# Patient Record
Sex: FEMALE | Race: BLACK OR AFRICAN AMERICAN | URBAN - METROPOLITAN AREA
[De-identification: names, ages, dates, MRNs, and addresses within clinical notes are randomized per-mention and may not be internally consistent; named-entity substitution may affect disease eponyms.]

---

## 2021-11-11 ENCOUNTER — LAB OUTSIDE AN ENCOUNTER (OUTPATIENT)
Dept: URBAN - METROPOLITAN AREA CLINIC 124 | Facility: CLINIC | Age: 76
End: 2021-11-11

## 2021-11-11 ENCOUNTER — TELEPHONE ENCOUNTER (OUTPATIENT)
Dept: URBAN - METROPOLITAN AREA CLINIC 92 | Facility: CLINIC | Age: 76
End: 2021-11-11

## 2021-11-15 ENCOUNTER — LAB OUTSIDE AN ENCOUNTER (OUTPATIENT)
Dept: URBAN - METROPOLITAN AREA CLINIC 124 | Facility: CLINIC | Age: 76
End: 2021-11-15

## 2021-11-15 ENCOUNTER — OFFICE VISIT (OUTPATIENT)
Dept: URBAN - METROPOLITAN AREA CLINIC 124 | Facility: CLINIC | Age: 76
End: 2021-11-15
Payer: MEDICARE

## 2021-11-15 VITALS
SYSTOLIC BLOOD PRESSURE: 149 MMHG | BODY MASS INDEX: 36.37 KG/M2 | TEMPERATURE: 97.1 F | WEIGHT: 213 LBS | HEIGHT: 64 IN | DIASTOLIC BLOOD PRESSURE: 79 MMHG | HEART RATE: 82 BPM

## 2021-11-15 DIAGNOSIS — Z12.11 COLON CANCER SCREENING: ICD-10-CM

## 2021-11-15 DIAGNOSIS — K57.90 DIVERTICULOSIS: ICD-10-CM

## 2021-11-15 DIAGNOSIS — K21.9 GASTROESOPHAGEAL REFLUX DISEASE, UNSPECIFIED WHETHER ESOPHAGITIS PRESENT: ICD-10-CM

## 2021-11-15 DIAGNOSIS — R14.0 BLOATING SYMPTOM: ICD-10-CM

## 2021-11-15 PROCEDURE — 99204 OFFICE O/P NEW MOD 45 MIN: CPT | Performed by: INTERNAL MEDICINE

## 2021-11-15 RX ORDER — POLYETHYLENE GLYOCOL 3350, SODIUM CHLORIDE, SODIUM BICARBONATE AND POTASSIUM CHLORIDE 420; 11.2; 5.72; 1.48 G/4L; G/4L; G/4L; G/4L
AS DIRECTED POWDER, FOR SOLUTION NASOGASTRIC; ORAL AS DIRECTED
Qty: 420 GRAM | Refills: 0 | OUTPATIENT
Start: 2021-11-11 | End: 2021-11-12

## 2021-11-15 RX ORDER — ONDANSETRON HYDROCHLORIDE 4 MG/1
1 TABLET AS NEEDED TABLET, FILM COATED ORAL ONCE A DAY
Qty: 2 | Refills: 0 | OUTPATIENT
Start: 2021-11-11

## 2021-11-15 NOTE — HPI-TODAY'S VISIT:
This is a 76-year-old female referred by Dr. Navas for GI consultation of colitis and a copy of this note be sent to the referring provider.  Patient saw our PAJero back in 2013 at which time she had an evaluation for abdominal pain.  Patient had an upper endoscopy by Dr. Senior on October 18, 2013 this revealed a small hiatal hernia and some gastritis.  Biopsy showed gastritis but no H. pylori and esophageal biopsy was normal.  Patient had a colonoscopy done by Dr. Senior on October 16, 2013 as well that showed pandiverticulosis but no other abnormalities.  She had a CT scan done on November 8, 2013 this revealed no acute findings. Pt is on omeprazole and hyoscymaine. Pts brother had a cancerous polyp but she denies fam hx of colon ca. Pt denies consitpation but she is very gassy and sometimes stools are irregular and small. This occurred since Covid and she is not exercising now and not eating well. Pt also has back issues and has had 2 injections. Not having surgery for her back. Pt says her reflux is fine on omeprzole and denies dysphagia or heartburn. Pt loves beans and uses beano, she is lactose intolerance and uses lactaid. Pt is a retired Nurse.

## 2022-03-24 ENCOUNTER — CLAIMS CREATED FROM THE CLAIM WINDOW (OUTPATIENT)
Dept: URBAN - METROPOLITAN AREA SURGERY CENTER 16 | Facility: SURGERY CENTER | Age: 77
End: 2022-03-24
Payer: MEDICARE

## 2022-03-24 ENCOUNTER — OFFICE VISIT (OUTPATIENT)
Dept: URBAN - METROPOLITAN AREA SURGERY CENTER 16 | Facility: SURGERY CENTER | Age: 77
End: 2022-03-24

## 2022-03-24 ENCOUNTER — CLAIMS CREATED FROM THE CLAIM WINDOW (OUTPATIENT)
Dept: URBAN - METROPOLITAN AREA CLINIC 4 | Facility: CLINIC | Age: 77
End: 2022-03-24
Payer: MEDICARE

## 2022-03-24 DIAGNOSIS — Z83.71 FAMILY HISTORY OF COLON POLYPS: ICD-10-CM

## 2022-03-24 DIAGNOSIS — K31.89 ACQUIRED DEFORMITY OF DUODENUM: ICD-10-CM

## 2022-03-24 DIAGNOSIS — D12.5 ADENOMA OF SIGMOID COLON: ICD-10-CM

## 2022-03-24 DIAGNOSIS — K21.9 ACID REFLUX: ICD-10-CM

## 2022-03-24 DIAGNOSIS — K31.89 FOCAL FOVEOLAR HYPERPLASIA: ICD-10-CM

## 2022-03-24 DIAGNOSIS — D12.5 BENIGN NEOPLASM OF SIGMOID COLON: ICD-10-CM

## 2022-03-24 PROCEDURE — 43239 EGD BIOPSY SINGLE/MULTIPLE: CPT | Performed by: INTERNAL MEDICINE

## 2022-03-24 PROCEDURE — 88305 TISSUE EXAM BY PATHOLOGIST: CPT | Performed by: PATHOLOGY

## 2022-03-24 PROCEDURE — 45385 COLONOSCOPY W/LESION REMOVAL: CPT | Performed by: INTERNAL MEDICINE

## 2022-03-24 PROCEDURE — G8907 PT DOC NO EVENTS ON DISCHARG: HCPCS | Performed by: INTERNAL MEDICINE

## 2022-03-24 PROCEDURE — 88342 IMHCHEM/IMCYTCHM 1ST ANTB: CPT | Performed by: PATHOLOGY

## 2022-06-01 ENCOUNTER — OFFICE VISIT (OUTPATIENT)
Dept: URBAN - METROPOLITAN AREA CLINIC 92 | Facility: CLINIC | Age: 77
End: 2022-06-01
Payer: MEDICARE

## 2022-06-01 ENCOUNTER — WEB ENCOUNTER (OUTPATIENT)
Dept: URBAN - METROPOLITAN AREA CLINIC 92 | Facility: CLINIC | Age: 77
End: 2022-06-01

## 2022-06-01 VITALS
HEIGHT: 64 IN | DIASTOLIC BLOOD PRESSURE: 82 MMHG | SYSTOLIC BLOOD PRESSURE: 152 MMHG | TEMPERATURE: 97.1 F | HEART RATE: 96 BPM | BODY MASS INDEX: 35.68 KG/M2 | WEIGHT: 209 LBS

## 2022-06-01 DIAGNOSIS — Z83.71 FAMILY HISTORY OF COLONIC POLYPS: ICD-10-CM

## 2022-06-01 DIAGNOSIS — K21.9 GASTROESOPHAGEAL REFLUX DISEASE, UNSPECIFIED WHETHER ESOPHAGITIS PRESENT: ICD-10-CM

## 2022-06-01 DIAGNOSIS — R14.2 ERUCTATION: ICD-10-CM

## 2022-06-01 DIAGNOSIS — R14.0 BLOATING SYMPTOM: ICD-10-CM

## 2022-06-01 DIAGNOSIS — K57.30 COLON, DIVERTICULOSIS: ICD-10-CM

## 2022-06-01 DIAGNOSIS — Z86.010 HISTORY OF COLON POLYPS: ICD-10-CM

## 2022-06-01 DIAGNOSIS — R14.3 FLATULENCE: ICD-10-CM

## 2022-06-01 PROBLEM — 428283002: Status: ACTIVE | Noted: 2022-06-01

## 2022-06-01 PROBLEM — 397881000: Status: ACTIVE | Noted: 2021-11-11

## 2022-06-01 PROBLEM — 235595009: Status: ACTIVE | Noted: 2021-11-15

## 2022-06-01 PROCEDURE — 99213 OFFICE O/P EST LOW 20 MIN: CPT | Performed by: PHYSICIAN ASSISTANT

## 2022-06-01 RX ORDER — ONDANSETRON HYDROCHLORIDE 4 MG/1
1 TABLET AS NEEDED TABLET, FILM COATED ORAL ONCE A DAY
Qty: 2 | Refills: 0 | Status: ACTIVE | COMMUNITY
Start: 2021-11-11

## 2022-06-01 NOTE — HPI-TODAY'S VISIT:
This is a 76yoF presents for f/u after EGD/colonoscopy.   She had a colonoscopy 3/24/2022 which showed diverticulosis of sigm, descending and cecum, hemorrhoids and 3 6-10mm polyps c/w TVAs. Prep was only fair and rec repeat in 1 year rec. Prior colon in 2013 showed only pandiverticula. Her brother had a precancerous polyp but no FH colon CA. She has BMs daily but despite this notes painful gas and bloating, improved some with massaging her belly, prn gas-ex and lactaid pills, agg by high fiber foods and dairy. No hematochezia or melena.   Pt is on omeprazole and denies heartburn, regurg or dysphagia. EGD 3/24/2022 showed mild gastritis without HP and normal esophagus. She does have sign eructation.

## 2022-06-01 NOTE — PHYSICAL EXAM CONSTITUTIONAL:
well developed, well nourished , in no acute distress , ambulating w cane , normal communication ability

## 2022-09-30 NOTE — PHYSICAL EXAM HENT:
Head,  normocephalic,  atraumatic,  Face,  Face within normal limits,  Ears,  External ears within normal limits,  Nose/Nasopharynx,  External nose  normal appearance,  nares patent,  no nasal discharge,  Mouth and Throat,  Oral cavity appearance normal,  Breath odor normal,  Lips,  Appearance normal
Yes

## 2023-06-01 ENCOUNTER — OFFICE VISIT (OUTPATIENT)
Dept: URBAN - METROPOLITAN AREA CLINIC 92 | Facility: CLINIC | Age: 78
End: 2023-06-01
Payer: MEDICARE

## 2023-06-01 VITALS
HEART RATE: 82 BPM | TEMPERATURE: 97.2 F | BODY MASS INDEX: 34.15 KG/M2 | WEIGHT: 200 LBS | DIASTOLIC BLOOD PRESSURE: 76 MMHG | HEIGHT: 64 IN | SYSTOLIC BLOOD PRESSURE: 142 MMHG

## 2023-06-01 DIAGNOSIS — Z86.010 HISTORY OF COLON POLYPS: ICD-10-CM

## 2023-06-01 DIAGNOSIS — Z83.71 FAMILY HISTORY OF COLONIC POLYPS: ICD-10-CM

## 2023-06-01 DIAGNOSIS — K57.30 ACQUIRED DIVERTICULOSIS OF COLON: ICD-10-CM

## 2023-06-01 DIAGNOSIS — K21.9 ACID REFLUX: ICD-10-CM

## 2023-06-01 PROCEDURE — 99213 OFFICE O/P EST LOW 20 MIN: CPT | Performed by: PHYSICIAN ASSISTANT

## 2023-06-01 RX ORDER — OMEPRAZOLE 40 MG/1
1 CAPSULE 30 MINUTES BEFORE MORNING MEAL CAPSULE, DELAYED RELEASE ORAL ONCE A DAY
Qty: 90 | Refills: 3 | OUTPATIENT
Start: 2023-06-01

## 2023-06-01 RX ORDER — SODIUM, POTASSIUM,MAG SULFATES 17.5-3.13G
177ML SOLUTION, RECONSTITUTED, ORAL ORAL
Qty: 177 ML | Refills: 0 | OUTPATIENT
Start: 2023-06-01 | End: 2023-06-02

## 2023-06-01 RX ORDER — AMLODIPINE BESYLATE 2.5 MG/1
1 TABLET TABLET ORAL ONCE A DAY
Status: ACTIVE | COMMUNITY

## 2023-06-01 RX ORDER — ONDANSETRON HYDROCHLORIDE 4 MG/1
1 TABLET AS NEEDED TABLET, FILM COATED ORAL ONCE A DAY
Qty: 2 | Refills: 0 | Status: ACTIVE | COMMUNITY
Start: 2021-11-11

## 2023-06-01 NOTE — HPI-TODAY'S VISIT:
This is a 77yoF presents for annual OV.  She had a colonoscopy 3/24/2022 which showed diverticulosis of sigm, descending and cecum, hemorrhoids and 3 6-10mm polyps c/w TVAs. Prep was only fair and rec repeat in 1 year rec. Prior colon in 2013 showed only pandiverticula. Her brother had a precancerous polyp but no FH colon CA. She has BMs daily without hematochezia or melena. Some post-prandial bloating with certain foods like bananas and beans. No abd pain, N/V, anorexia or weight loss.   Pt is on omeprazole and denies heartburn, regurg or dysphagia. EGD 3/24/2022 showed mild gastritis without HP and normal esophagus.  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

## 2023-12-18 ENCOUNTER — OFFICE VISIT (OUTPATIENT)
Dept: URBAN - METROPOLITAN AREA CLINIC 92 | Facility: CLINIC | Age: 78
End: 2023-12-18
Payer: MEDICARE

## 2023-12-18 VITALS
DIASTOLIC BLOOD PRESSURE: 73 MMHG | SYSTOLIC BLOOD PRESSURE: 140 MMHG | HEIGHT: 64 IN | TEMPERATURE: 97.2 F | BODY MASS INDEX: 36.12 KG/M2 | WEIGHT: 211.6 LBS | HEART RATE: 89 BPM

## 2023-12-18 DIAGNOSIS — Z83.71 FAMILY HISTORY OF COLONIC POLYPS: ICD-10-CM

## 2023-12-18 DIAGNOSIS — K59.01 SLOW TRANSIT CONSTIPATION: ICD-10-CM

## 2023-12-18 DIAGNOSIS — K57.90 DIVERTICULOSIS: ICD-10-CM

## 2023-12-18 DIAGNOSIS — R10.31 RLQ ABDOMINAL PAIN: ICD-10-CM

## 2023-12-18 DIAGNOSIS — K92.1 HEMATOCHEZIA: ICD-10-CM

## 2023-12-18 DIAGNOSIS — R10.11 RUQ ABDOMINAL PAIN: ICD-10-CM

## 2023-12-18 DIAGNOSIS — R14.0 BLOATING SYMPTOM: ICD-10-CM

## 2023-12-18 DIAGNOSIS — K21.9 GASTROESOPHAGEAL REFLUX DISEASE, UNSPECIFIED WHETHER ESOPHAGITIS PRESENT: ICD-10-CM

## 2023-12-18 DIAGNOSIS — Z86.010 HISTORY OF COLON POLYPS: ICD-10-CM

## 2023-12-18 PROBLEM — 35298007: Status: ACTIVE | Noted: 2023-12-18

## 2023-12-18 PROCEDURE — 99213 OFFICE O/P EST LOW 20 MIN: CPT

## 2023-12-18 RX ORDER — AMLODIPINE BESYLATE 2.5 MG/1
1 TABLET TABLET ORAL ONCE A DAY
Status: ACTIVE | COMMUNITY

## 2023-12-18 RX ORDER — ONDANSETRON HYDROCHLORIDE 4 MG/1
1 TABLET AS NEEDED TABLET, FILM COATED ORAL ONCE A DAY
Qty: 2 | Refills: 0 | Status: ON HOLD | COMMUNITY
Start: 2021-11-11

## 2023-12-18 RX ORDER — OMEPRAZOLE 40 MG/1
1 CAPSULE 30 MINUTES BEFORE MORNING MEAL CAPSULE, DELAYED RELEASE ORAL ONCE A DAY
Qty: 90 | Refills: 3 | Status: ACTIVE | COMMUNITY
Start: 2023-06-01

## 2023-12-18 NOTE — HPI-TODAY'S VISIT:
This is a 78 year old female presents for blood in stool. Patient was previously seen in June by BARBARA Hanna and is scheduled for her colonoscopy on 1/04/23.   Colonoscopy 3/24/2022 which showed diverticulosis of sigm, descending and cecum, hemorrhoids and 3 6-10mm polyps c/w TVAs. Prep was only fair and rec repeat in 1 year rec. Prior colon in 2013 showed only pandiverticula. Her brother had a precancerous polyp but no FH colon CA. She has BMs daily without hematochezia or melena. Some post-prandial bloating with certain foods like bananas and beans. No abd pain, N/V, anorexia or weight loss.   Pt is on omeprazole and denies heartburn, regurg or dysphagia. EGD 3/24/2022 showed mild gastritis without HP and normal esophagus.  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  Today, patient reports 3-4 weeks after her previous visit she developed increased reflux, belching, and notes her food is not digesting. Notes food is "going down the wrong way". States food sits in her RUQ. Admits to early satiety. Denies dysphagia, nausea, or vomiting. She is c/w omeprazole 40mg QD. Patient also reports she develops BRBPR and mucus if she does not take Maalox or sits too long to have a BM. She is drinking plenty of fluids. She takes MiraLax only as needed. Also notes of RLQ discomfort and states she thinks she needs to get cleaned out.  s/p cholecystectomy. No diabetes.

## 2024-01-04 ENCOUNTER — OUT OF OFFICE VISIT (OUTPATIENT)
Dept: URBAN - METROPOLITAN AREA SURGERY CENTER 16 | Facility: SURGERY CENTER | Age: 79
End: 2024-01-04
Payer: MEDICARE

## 2024-01-04 ENCOUNTER — CLAIMS CREATED FROM THE CLAIM WINDOW (OUTPATIENT)
Dept: URBAN - METROPOLITAN AREA CLINIC 4 | Facility: CLINIC | Age: 79
End: 2024-01-04
Payer: MEDICARE

## 2024-01-04 ENCOUNTER — TELEPHONE ENCOUNTER (OUTPATIENT)
Dept: URBAN - METROPOLITAN AREA CLINIC 96 | Facility: CLINIC | Age: 79
End: 2024-01-04

## 2024-01-04 DIAGNOSIS — D12.5 ADENOMA OF SIGMOID COLON: ICD-10-CM

## 2024-01-04 DIAGNOSIS — K63.5 BENIGN COLON POLYP: ICD-10-CM

## 2024-01-04 DIAGNOSIS — K57.90 DIVERTICULOSIS: ICD-10-CM

## 2024-01-04 DIAGNOSIS — K64.8 EXTERNAL HEMORRHOIDS: ICD-10-CM

## 2024-01-04 DIAGNOSIS — I10 ACCELERATED ESSENTIAL HYPERTENSION: ICD-10-CM

## 2024-01-04 DIAGNOSIS — D12.5 BENIGN NEOPLASM OF SIGMOID COLON: ICD-10-CM

## 2024-01-04 DIAGNOSIS — K62.5 ANAL BLEEDING: ICD-10-CM

## 2024-01-04 DIAGNOSIS — K63.89 APPENDICITIS EPIPLOICA: ICD-10-CM

## 2024-01-04 DIAGNOSIS — K57.30 ACQUIRED DIVERTICULOSIS OF COLON: ICD-10-CM

## 2024-01-04 PROCEDURE — 45380 COLONOSCOPY AND BIOPSY: CPT | Performed by: INTERNAL MEDICINE

## 2024-01-04 PROCEDURE — 45385 COLONOSCOPY W/LESION REMOVAL: CPT | Performed by: INTERNAL MEDICINE

## 2024-01-04 PROCEDURE — G8907 PT DOC NO EVENTS ON DISCHARG: HCPCS | Performed by: CLINIC/CENTER

## 2024-01-04 PROCEDURE — 45380 COLONOSCOPY AND BIOPSY: CPT | Performed by: CLINIC/CENTER

## 2024-01-04 PROCEDURE — 00811 ANES LWR INTST NDSC NOS: CPT | Performed by: ANESTHESIOLOGIST ASSISTANT

## 2024-01-04 PROCEDURE — 00811 ANES LWR INTST NDSC NOS: CPT | Performed by: ANESTHESIOLOGY

## 2024-01-04 PROCEDURE — 88305 TISSUE EXAM BY PATHOLOGIST: CPT | Performed by: PATHOLOGY

## 2024-01-04 PROCEDURE — 45385 COLONOSCOPY W/LESION REMOVAL: CPT | Performed by: CLINIC/CENTER

## 2024-01-04 RX ORDER — OMEPRAZOLE 40 MG/1
1 CAPSULE 30 MINUTES BEFORE MORNING MEAL CAPSULE, DELAYED RELEASE ORAL ONCE A DAY
Qty: 90 | Refills: 3 | Status: ACTIVE | COMMUNITY
Start: 2023-06-01

## 2024-01-04 RX ORDER — ONDANSETRON HYDROCHLORIDE 4 MG/1
1 TABLET AS NEEDED TABLET, FILM COATED ORAL ONCE A DAY
Qty: 2 | Refills: 0 | Status: ON HOLD | COMMUNITY
Start: 2021-11-11

## 2024-01-04 RX ORDER — AMLODIPINE BESYLATE 2.5 MG/1
1 TABLET TABLET ORAL ONCE A DAY
Status: ACTIVE | COMMUNITY

## 2024-01-08 ENCOUNTER — TELEPHONE ENCOUNTER (OUTPATIENT)
Dept: URBAN - METROPOLITAN AREA CLINIC 92 | Facility: CLINIC | Age: 79
End: 2024-01-08

## 2024-01-08 RX ORDER — CIPROFLOXACIN 500 MG/1
1 TABLET TABLET, FILM COATED ORAL
Qty: 20 TABLET | Refills: 0 | OUTPATIENT
Start: 2024-01-08 | End: 2024-01-18

## 2024-01-08 RX ORDER — METRONIDAZOLE 500 MG/1
1 TABLET TABLET ORAL
Qty: 20 TABLET | Refills: 0 | OUTPATIENT
Start: 2024-01-08 | End: 2024-01-18

## 2024-01-08 RX ORDER — LINACLOTIDE 145 UG/1
1 CAPSULE AT LEAST 30 MINUTES BEFORE THE FIRST MEAL OF THE DAY ON AN EMPTY STOMACH CAPSULE, GELATIN COATED ORAL ONCE A DAY
Qty: 90 CAPSULE | Refills: 3 | OUTPATIENT
Start: 2024-01-08 | End: 2025-01-02

## 2024-01-09 ENCOUNTER — TELEPHONE ENCOUNTER (OUTPATIENT)
Dept: URBAN - METROPOLITAN AREA CLINIC 98 | Facility: CLINIC | Age: 79
End: 2024-01-09

## 2024-01-09 RX ORDER — FLUCONAZOLE 150 MG/1
1 TABLET TABLET ORAL ONCE
Qty: 1 | Refills: 1 | OUTPATIENT
Start: 2024-01-09 | End: 2024-01-11

## 2024-01-23 ENCOUNTER — OFFICE VISIT (OUTPATIENT)
Dept: URBAN - METROPOLITAN AREA CLINIC 92 | Facility: CLINIC | Age: 79
End: 2024-01-23
Payer: MEDICARE

## 2024-01-23 VITALS
TEMPERATURE: 97.3 F | HEART RATE: 93 BPM | HEIGHT: 64 IN | WEIGHT: 209 LBS | BODY MASS INDEX: 35.68 KG/M2 | SYSTOLIC BLOOD PRESSURE: 155 MMHG | DIASTOLIC BLOOD PRESSURE: 75 MMHG

## 2024-01-23 DIAGNOSIS — K21.9 ACID REFLUX: ICD-10-CM

## 2024-01-23 DIAGNOSIS — K57.90 DIVERTICULOSIS: ICD-10-CM

## 2024-01-23 DIAGNOSIS — K59.01 SLOW TRANSIT CONSTIPATION: ICD-10-CM

## 2024-01-23 DIAGNOSIS — Z86.010 HISTORY OF COLON POLYPS: ICD-10-CM

## 2024-01-23 PROBLEM — 724538004: Status: ACTIVE | Noted: 2024-01-23

## 2024-01-23 PROCEDURE — 99213 OFFICE O/P EST LOW 20 MIN: CPT | Performed by: PHYSICIAN ASSISTANT

## 2024-01-23 RX ORDER — LINACLOTIDE 145 UG/1
1 CAPSULE AT LEAST 30 MINUTES BEFORE THE FIRST MEAL OF THE DAY ON AN EMPTY STOMACH CAPSULE, GELATIN COATED ORAL ONCE A DAY
Qty: 90 CAPSULE | Refills: 3 | Status: ACTIVE | COMMUNITY
Start: 2024-01-08 | End: 2025-01-02

## 2024-01-23 RX ORDER — OMEPRAZOLE 40 MG/1
1 CAPSULE 30 MINUTES BEFORE MORNING MEAL CAPSULE, DELAYED RELEASE ORAL ONCE A DAY
Qty: 90 | Refills: 3 | Status: ACTIVE | COMMUNITY
Start: 2023-06-01

## 2024-01-23 RX ORDER — AMLODIPINE BESYLATE 2.5 MG/1
1 TABLET TABLET ORAL ONCE A DAY
Status: ACTIVE | COMMUNITY

## 2024-01-23 RX ORDER — ONDANSETRON HYDROCHLORIDE 4 MG/1
1 TABLET AS NEEDED TABLET, FILM COATED ORAL ONCE A DAY
Qty: 2 | Refills: 0 | Status: ON HOLD | COMMUNITY
Start: 2021-11-11

## 2024-01-23 NOTE — HPI-TODAY'S VISIT:
This is a 78yoF seen by Kath JIMENEZ in Dec 2023 for eval of BRBPR and mucus as well as RLQ discomfort and increased GERD and belching.   Colonoscopy 3/24/2022 which showed diverticulosis of sigm, descending and cecum, hemorrhoids and 3 6-10mm polyps c/w TVAs. Repeat colon 1/4/24 showed sig/desc diverticulosis, IT, sigmoid erythema and 5mm adenoma, bx confirmed diverticular disease assoc colitis. She was rx'd cipro/flagyl X 10 days, completed 1/19 with resolution of bleeding, mucus or pain. She has had more frequent stools and slightly loose since the antibiotics. She is on a daily yogurt probiotic.   Her brother had a precancerous polyp but no FH colon CA. She has some mild constipation at baseline with BMs daily but hard and had to strain. Has a rx for linzess but didnt start yet 2' above GI issues.   Pt is on omeprazole and denies heartburn, regurg or dysphagia. EGD 3/24/2022 showed mild gastritis without HP and normal esophagus.  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 s/p marlin

## 2024-01-23 NOTE — PHYSICAL EXAM CONSTITUTIONAL:
well developed, well nourished , in no acute distress , ambulate with cane , normal communication ability

## 2024-03-11 ENCOUNTER — OV EP (OUTPATIENT)
Dept: URBAN - METROPOLITAN AREA CLINIC 124 | Facility: CLINIC | Age: 79
End: 2024-03-11
Payer: MEDICARE

## 2024-03-11 VITALS
DIASTOLIC BLOOD PRESSURE: 72 MMHG | BODY MASS INDEX: 35.85 KG/M2 | HEART RATE: 92 BPM | WEIGHT: 210 LBS | HEIGHT: 64 IN | TEMPERATURE: 97 F | SYSTOLIC BLOOD PRESSURE: 140 MMHG

## 2024-03-11 DIAGNOSIS — K21.9 ACID REFLUX: ICD-10-CM

## 2024-03-11 DIAGNOSIS — K57.90 DIVERTICULOSIS: ICD-10-CM

## 2024-03-11 DIAGNOSIS — K59.01 SLOW TRANSIT CONSTIPATION: ICD-10-CM

## 2024-03-11 DIAGNOSIS — K92.1 HEMATOCHEZIA: ICD-10-CM

## 2024-03-11 PROCEDURE — 99214 OFFICE O/P EST MOD 30 MIN: CPT | Performed by: PHYSICIAN ASSISTANT

## 2024-03-11 RX ORDER — LINACLOTIDE 145 UG/1
1 CAPSULE AT LEAST 30 MINUTES BEFORE THE FIRST MEAL OF THE DAY ON AN EMPTY STOMACH CAPSULE, GELATIN COATED ORAL ONCE A DAY
Qty: 90 CAPSULE | Refills: 3
Start: 2024-01-08 | End: 2025-03-06

## 2024-03-11 RX ORDER — MESALAMINE 1.2 G/1
2 TABLETS WITH A MEAL TABLET, DELAYED RELEASE ORAL ONCE A DAY
Qty: 60 | Status: ACTIVE | COMMUNITY
Start: 2024-02-23 | End: 2024-03-24

## 2024-03-11 RX ORDER — OMEPRAZOLE 40 MG/1
1 CAPSULE 30 MINUTES BEFORE MORNING MEAL CAPSULE, DELAYED RELEASE ORAL ONCE A DAY
Qty: 90 | Refills: 3
Start: 2023-06-01

## 2024-03-11 RX ORDER — ONDANSETRON HYDROCHLORIDE 4 MG/1
1 TABLET AS NEEDED TABLET, FILM COATED ORAL ONCE A DAY
Qty: 2 | Refills: 0 | Status: ON HOLD | COMMUNITY
Start: 2021-11-11

## 2024-03-11 RX ORDER — LINACLOTIDE 145 UG/1
1 CAPSULE AT LEAST 30 MINUTES BEFORE THE FIRST MEAL OF THE DAY ON AN EMPTY STOMACH CAPSULE, GELATIN COATED ORAL ONCE A DAY
Qty: 90 CAPSULE | Refills: 3 | Status: ON HOLD | COMMUNITY
Start: 2024-01-08 | End: 2025-01-02

## 2024-03-11 RX ORDER — AMLODIPINE BESYLATE 2.5 MG/1
1 TABLET TABLET ORAL ONCE A DAY
Status: ACTIVE | COMMUNITY

## 2024-03-11 NOTE — HPI-TODAY'S VISIT:
This is a 78yoF presents for f/u of rectal bleeding. Initially seen in the winter 2024 for BRB, mucus,RLQ discomfort and increased GERD and belching.   Colonoscopy 3/24/2022 which showed diverticulosis of sigm, descending and cecum, hemorrhoids and 3 6-10mm polyps c/w TVAs. Repeat colon 1/4/24 showed sig/desc diverticulosis, IT, sigmoid erythema and 5mm adenoma, bx confirmed diverticular disease assoc colitis. She was rx'd cipro/flagyl X 10 days, completed 1/19 with resolution of bleeding, mucus and pain. She then called the office in mid Feb with recurrent bleeding and RLQ discomfort and rx'd mesalamine 2.4g/day and on this the bleeding resolved.   Her brother had a precancerous polyp but no FH colon CA. She has some mild constipation at baseline with BMs daily but hard and had to strain. Has a rx for linzess which helped but stopped this with mesalamine. She has 2-3 BMs/day, formed but incomplete emptying. prn miralax helps.   Pt is on omeprazole and denies heartburn, regurg or dysphagia. EGD 3/24/2022 showed mild gastritis without HP and normal esophagus. Mild early satiety without weight loss, improved when on linzess.   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 s/p marlin

## 2024-05-13 ENCOUNTER — DASHBOARD ENCOUNTERS (OUTPATIENT)
Age: 79
End: 2024-05-13

## 2024-05-13 ENCOUNTER — OFFICE VISIT (OUTPATIENT)
Dept: URBAN - METROPOLITAN AREA CLINIC 92 | Facility: CLINIC | Age: 79
End: 2024-05-13
Payer: MEDICARE

## 2024-05-13 VITALS
DIASTOLIC BLOOD PRESSURE: 73 MMHG | HEART RATE: 90 BPM | WEIGHT: 211 LBS | SYSTOLIC BLOOD PRESSURE: 131 MMHG | TEMPERATURE: 96.8 F | HEIGHT: 64 IN | BODY MASS INDEX: 36.02 KG/M2

## 2024-05-13 DIAGNOSIS — R14.0 BLOATING SYMPTOM: ICD-10-CM

## 2024-05-13 DIAGNOSIS — K50.10 CROHN'S DISEASE OF LARGE INTESTINE WITHOUT COMPLICATIONS: ICD-10-CM

## 2024-05-13 DIAGNOSIS — K57.30 DIVERTICULOSIS OF LARGE INTESTINE WITHOUT PERFORATION OR ABSCESS WITHOUT BLEEDING: ICD-10-CM

## 2024-05-13 DIAGNOSIS — K21.9 GASTROESOPHAGEAL REFLUX DISEASE, UNSPECIFIED WHETHER ESOPHAGITIS PRESENT: ICD-10-CM

## 2024-05-13 PROBLEM — 64226004: Status: ACTIVE | Noted: 2024-05-13

## 2024-05-13 PROCEDURE — 99214 OFFICE O/P EST MOD 30 MIN: CPT | Performed by: PHYSICIAN ASSISTANT

## 2024-05-13 RX ORDER — AMLODIPINE BESYLATE 2.5 MG/1
1 TABLET TABLET ORAL ONCE A DAY
Status: ACTIVE | COMMUNITY

## 2024-05-13 RX ORDER — OMEPRAZOLE 40 MG/1
1 CAPSULE 30 MINUTES BEFORE MORNING MEAL CAPSULE, DELAYED RELEASE ORAL ONCE A DAY
Qty: 90 | Refills: 3 | Status: ACTIVE | COMMUNITY
Start: 2023-06-01

## 2024-05-13 RX ORDER — MESALAMINE 1.2 G/1
2 TABLETS WITH A MEAL TABLET, DELAYED RELEASE ORAL ONCE A DAY
Qty: 60
Start: 2024-02-23 | End: 2024-06-12

## 2024-05-13 RX ORDER — ONDANSETRON HYDROCHLORIDE 4 MG/1
1 TABLET AS NEEDED TABLET, FILM COATED ORAL ONCE A DAY
Qty: 2 | Refills: 0 | Status: ON HOLD | COMMUNITY
Start: 2021-11-11

## 2024-05-13 RX ORDER — MESALAMINE 1.2 G/1
4 TABLETS WITH A MEAL TABLET, DELAYED RELEASE ORAL ONCE A DAY
Qty: 120 TABLET | Refills: 3 | OUTPATIENT
Start: 2024-05-13 | End: 2024-09-10

## 2024-05-13 RX ORDER — OMEPRAZOLE 40 MG/1
1 CAPSULE 30 MINUTES BEFORE MORNING MEAL CAPSULE, DELAYED RELEASE ORAL ONCE A DAY
Qty: 90 | Refills: 3

## 2024-05-13 RX ORDER — LINACLOTIDE 145 UG/1
1 CAPSULE AT LEAST 30 MINUTES BEFORE THE FIRST MEAL OF THE DAY ON AN EMPTY STOMACH CAPSULE, GELATIN COATED ORAL ONCE A DAY
Qty: 90 CAPSULE | Refills: 3 | Status: ACTIVE | COMMUNITY
Start: 2024-01-08 | End: 2025-03-06

## 2024-05-13 RX ORDER — BUDESONIDE 9 MG/1
1 TABLET IN THE MORNING TABLET, EXTENDED RELEASE ORAL ONCE A DAY
Qty: 30 | OUTPATIENT
Start: 2024-05-13 | End: 2024-06-12

## 2024-05-13 RX ORDER — MESALAMINE 1.2 G/1
2 TABLETS WITH A MEAL TABLET, DELAYED RELEASE ORAL ONCE A DAY
Qty: 60 | Status: DISCONTINUED | COMMUNITY
Start: 2024-02-23 | End: 2024-05-19

## 2024-05-13 RX ORDER — LINACLOTIDE 145 UG/1
1 CAPSULE AT LEAST 30 MINUTES BEFORE THE FIRST MEAL OF THE DAY ON AN EMPTY STOMACH CAPSULE, GELATIN COATED ORAL ONCE A DAY
Qty: 90 CAPSULE | Refills: 3

## 2024-06-26 ENCOUNTER — TELEPHONE ENCOUNTER (OUTPATIENT)
Dept: URBAN - METROPOLITAN AREA CLINIC 98 | Facility: CLINIC | Age: 79
End: 2024-06-26

## 2024-06-26 RX ORDER — METRONIDAZOLE 500 MG/1
1 TABLET TABLET ORAL
Qty: 28 TABLET | Refills: 0
Start: 2024-01-08 | End: 2024-07-10

## 2024-06-26 RX ORDER — CIPROFLOXACIN 500 MG/1
1 TABLET TABLET, FILM COATED ORAL
Qty: 28 TABLET | Refills: 0
Start: 2024-01-08 | End: 2024-07-10

## 2024-07-02 ENCOUNTER — TELEPHONE ENCOUNTER (OUTPATIENT)
Dept: URBAN - METROPOLITAN AREA CLINIC 98 | Facility: CLINIC | Age: 79
End: 2024-07-02

## 2024-07-26 ENCOUNTER — OFFICE VISIT (OUTPATIENT)
Dept: URBAN - METROPOLITAN AREA CLINIC 92 | Facility: CLINIC | Age: 79
End: 2024-07-26
Payer: MEDICARE

## 2024-07-26 VITALS
SYSTOLIC BLOOD PRESSURE: 145 MMHG | TEMPERATURE: 97.3 F | WEIGHT: 208 LBS | HEART RATE: 110 BPM | HEIGHT: 64 IN | DIASTOLIC BLOOD PRESSURE: 73 MMHG | BODY MASS INDEX: 35.51 KG/M2

## 2024-07-26 DIAGNOSIS — K59.01 SLOW TRANSIT CONSTIPATION: ICD-10-CM

## 2024-07-26 DIAGNOSIS — K57.30 ACQUIRED DIVERTICULOSIS OF COLON: ICD-10-CM

## 2024-07-26 DIAGNOSIS — K21.9 ACID REFLUX: ICD-10-CM

## 2024-07-26 DIAGNOSIS — K50.10 CROHN'S DISEASE OF LARGE INTESTINE WITHOUT COMPLICATIONS: ICD-10-CM

## 2024-07-26 PROCEDURE — 99213 OFFICE O/P EST LOW 20 MIN: CPT

## 2024-07-26 RX ORDER — OMEPRAZOLE 40 MG/1
1 CAPSULE 30 MINUTES BEFORE MORNING MEAL CAPSULE, DELAYED RELEASE ORAL ONCE A DAY
Qty: 90 | Refills: 3

## 2024-07-26 RX ORDER — LINACLOTIDE 145 UG/1
1 CAPSULE AT LEAST 30 MINUTES BEFORE THE FIRST MEAL OF THE DAY ON AN EMPTY STOMACH CAPSULE, GELATIN COATED ORAL ONCE A DAY
Qty: 90 CAPSULE | Refills: 3 | Status: ACTIVE | COMMUNITY

## 2024-07-26 RX ORDER — OMEPRAZOLE 40 MG/1
1 CAPSULE 30 MINUTES BEFORE MORNING MEAL CAPSULE, DELAYED RELEASE ORAL ONCE A DAY
Qty: 90 | Refills: 3 | Status: ACTIVE | COMMUNITY

## 2024-07-26 RX ORDER — AMLODIPINE BESYLATE 2.5 MG/1
1 TABLET TABLET ORAL ONCE A DAY
Status: ACTIVE | COMMUNITY

## 2024-07-26 RX ORDER — ONDANSETRON HYDROCHLORIDE 4 MG/1
1 TABLET AS NEEDED TABLET, FILM COATED ORAL ONCE A DAY
Qty: 2 | Refills: 0 | Status: ON HOLD | COMMUNITY
Start: 2021-11-11

## 2024-07-26 RX ORDER — MESALAMINE 1.2 G/1
4 TABLETS WITH A MEAL TABLET, DELAYED RELEASE ORAL ONCE A DAY
Qty: 120 TABLET | Refills: 3 | OUTPATIENT

## 2024-07-26 RX ORDER — MESALAMINE 1.2 G/1
4 TABLETS WITH A MEAL TABLET, DELAYED RELEASE ORAL ONCE A DAY
Qty: 120 TABLET | Refills: 3 | Status: ACTIVE | COMMUNITY
Start: 2024-05-13 | End: 2024-09-10

## 2024-07-26 NOTE — HPI-TODAY'S VISIT:
This is a 78yoF presents for f/u of rectal bleeding. Initially seen in the winter 2023 for BRB, mucus,RLQ discomfort and increased GERD and belching.   Colonoscopy 3/24/2022 which showed diverticulosis of sigm, descending and cecum, hemorrhoids and 3 6-10mm polyps c/w TVAs. Repeat colon 1/4/24 showed sig/desc diverticulosis, IT, sigmoid erythema and 5mm adenoma, bx confirmed diverticular disease assoc colitis. She was rx'd cipro/flagyl X 10 days, completed 1/19 with resolution of bleeding, mucus and pain. She then called the office in mid Feb with recurrent bleeding and RLQ discomfort and rx'd mesalamine 2.4g/day and on this the bleeding resolved so f/c after 1m and did ok until end of April when had recurrent bleeding. She restarted mesalamine and notes bleeding has improved but still occuring. She was given another course of cipro/flagyl by Eboni she noted a rash on her face and arms and was told to d/c. Since d/c the meds no more rash. She is having increased sinus pressure and productive cough. Has not spoken to her pcp about this.  Currently not on any meds for SCAD. She has loose BM daily with mucus and rectal bleeding. Rectal bleeding not as severe as it was in the past  BMs 3-5 times in the AM. Baseline is 1BM/day. She is cramping more due to BM   Her brother had a precancerous polyp but no FH colon CA. She has some mild constipation at baseline with BMs daily but hard and had to strain. Was on linzess  but stopped this with SCAD.   Pt is on omeprazole and denies heartburn, regurg or dysphagia. EGD 3/24/2022 showed mild gastritis without HP and normal esophagus.   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 s/p marlin

## 2024-09-12 ENCOUNTER — OFFICE VISIT (OUTPATIENT)
Dept: URBAN - METROPOLITAN AREA CLINIC 92 | Facility: CLINIC | Age: 79
End: 2024-09-12

## 2025-03-11 ENCOUNTER — OFFICE VISIT (OUTPATIENT)
Dept: URBAN - METROPOLITAN AREA CLINIC 124 | Facility: CLINIC | Age: 80
End: 2025-03-11

## 2025-03-11 VITALS — HEIGHT: 64 IN

## 2025-03-11 RX ORDER — AMLODIPINE BESYLATE 2.5 MG/1
1 TABLET TABLET ORAL ONCE A DAY
Status: ACTIVE | COMMUNITY

## 2025-03-11 RX ORDER — LINACLOTIDE 145 UG/1
1 CAPSULE AT LEAST 30 MINUTES BEFORE THE FIRST MEAL OF THE DAY ON AN EMPTY STOMACH CAPSULE, GELATIN COATED ORAL ONCE A DAY
Qty: 90 CAPSULE | Refills: 3 | Status: ACTIVE | COMMUNITY

## 2025-03-11 RX ORDER — MESALAMINE 1.2 G/1
4 TABLETS WITH A MEAL TABLET, DELAYED RELEASE ORAL ONCE A DAY
Qty: 120 TABLET | Refills: 3 | Status: ACTIVE | COMMUNITY

## 2025-03-11 RX ORDER — OMEPRAZOLE 40 MG/1
1 CAPSULE 30 MINUTES BEFORE MORNING MEAL CAPSULE, DELAYED RELEASE ORAL ONCE A DAY
Qty: 90 | Refills: 3

## 2025-03-11 RX ORDER — OMEPRAZOLE 40 MG/1
1 CAPSULE 30 MINUTES BEFORE MORNING MEAL CAPSULE, DELAYED RELEASE ORAL ONCE A DAY
Qty: 90 | Refills: 3 | Status: ACTIVE | COMMUNITY

## 2025-03-11 RX ORDER — ONDANSETRON HYDROCHLORIDE 4 MG/1
1 TABLET AS NEEDED TABLET, FILM COATED ORAL ONCE A DAY
Qty: 2 | Refills: 0 | Status: ON HOLD | COMMUNITY
Start: 2021-11-11

## 2025-03-11 RX ORDER — MESALAMINE 1.2 G/1
4 TABLETS WITH A MEAL TABLET, DELAYED RELEASE ORAL ONCE A DAY
Qty: 120 TABLET | Refills: 3 | OUTPATIENT

## 2025-03-11 NOTE — HPI-TODAY'S VISIT:
This is a 79yoF presents for f/u. She was seen in the winter 2023 for BRB, mucus, RLQ discomfort and increased GERD and belching.   Colonoscopy 3/24/2022 which showed diverticulosis of sigm, descending and cecum, hemorrhoids and 3 6-10mm polyps c/w TVAs. Repeat colon 1/4/24 showed sig/desc diverticulosis, IT, sigmoid erythema and 5mm adenoma, bx confirmed diverticular disease assoc colitis. She was rx'd cipro/flagyl X 10 days, completed 1/19 with resolution of bleeding, mucus and pain. She then called the office in mid Feb with recurrent bleeding and RLQ discomfort and rx'd mesalamine 2.4g/day and on this the bleeding resolved so f/c after 1m and did ok until end of April when had recurrent bleeding. She restarted mesalamine w/ improvement in bleeding. She was given another course of cipro/flagyl but d/c bc of rash on her face and arms.   When off meds for SCAD and at last OV for Sandra noted loose BMs daily with mucus, rectal bleeding and cramping. She was given another month of mesalamine in July 2024 and now notes   Her brother had a precancerous polyp but no FH colon CA. She has some mild constipation at baseline with BMs daily but hard and had to strain. Was on linzess  but stopped this with SCAD.   Pt is on omeprazole and denies heartburn, regurg or dysphagia. EGD 3/24/2022 showed mild gastritis without HP and normal esophagus.   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 s/p marlin  K

## 2025-04-14 NOTE — PHYSICAL EXAM CONSTITUTIONAL:
well developed, well nourished , in no acute distress , ambulating without difficulty , normal communication ability
Attending Only